# Patient Record
Sex: MALE | Race: BLACK OR AFRICAN AMERICAN | Employment: STUDENT | URBAN - METROPOLITAN AREA
[De-identification: names, ages, dates, MRNs, and addresses within clinical notes are randomized per-mention and may not be internally consistent; named-entity substitution may affect disease eponyms.]

---

## 2019-03-14 ENCOUNTER — OFFICE VISIT (OUTPATIENT)
Dept: FAMILY MEDICINE | Facility: CLINIC | Age: 34
End: 2019-03-14
Payer: COMMERCIAL

## 2019-03-14 VITALS
OXYGEN SATURATION: 100 % | RESPIRATION RATE: 16 BRPM | TEMPERATURE: 98.3 F | WEIGHT: 196.8 LBS | DIASTOLIC BLOOD PRESSURE: 80 MMHG | HEART RATE: 88 BPM | SYSTOLIC BLOOD PRESSURE: 122 MMHG

## 2019-03-14 DIAGNOSIS — Z20.2 CHLAMYDIA CONTACT: ICD-10-CM

## 2019-03-14 DIAGNOSIS — Z11.3 SCREEN FOR STD (SEXUALLY TRANSMITTED DISEASE): Primary | ICD-10-CM

## 2019-03-14 LAB
BACTERIA: NORMAL
BILIRUBIN UR: ABNORMAL
BLOOD UR: ABNORMAL
CASTS: NORMAL /LPF
CRYSTAL URINE: NORMAL /LPF
EPITHELIAL CELLS UR: NORMAL /LPF (ref 0–2)
GLUCOSE URINE: NEGATIVE
KETONES UR QL: NEGATIVE
LEUKOCYTE ESTERASE UR: NEGATIVE
MUCOUS URINE: NORMAL LPF
NITRITE UR QL STRIP: NEGATIVE
PH UR STRIP: 5.5 [PH] (ref 5–7)
PROTEIN UR: ABNORMAL
RBC URINE: <2 /HPF
SP GR UR STRIP: >=1.03
UROBILINOGEN UR STRIP-ACNC: ABNORMAL
WBC URINE: <2 /HPF

## 2019-03-14 RX ORDER — AZITHROMYCIN 500 MG/1
1000 TABLET, FILM COATED ORAL DAILY
Qty: 2 TABLET | Refills: 0 | Status: SHIPPED | OUTPATIENT
Start: 2019-03-14 | End: 2019-03-15

## 2019-03-14 NOTE — LETTER
March 14, 2019      Hal Cain  [address]  [place] [address]        Dear Hal,    Please see below for your test results.  Your urine  was dark brown    needs to be repeated in 4 to 6 weeks after STD treatment     Resulted Orders   Urinalysis, Micro If ([facility])   Result Value Ref Range    Specific Gravity Urine >=1.030 1.005 - 1.030    pH Urine 5.5 4.5 - 8.0    Leukocyte Esterase UR Negative NEGATIVE    Nitrite Urine Negative NEGATIVE    Protein UR 1+ (A) NEGATIVE    Glucose Urine Negative NEGATIVE    Ketones Urine Negative NEGATIVE    Urobilinogen mg/dL 1.0 E.U./dL (A) 0.2 E.U./dL    Bilirubin UR 1+ (A) NEGATIVE    Blood UR 2+ (A) NEGATIVE   Urine Microscopic ([facility])   Result Value Ref Range    WBC Urine <2 <5 /hpf    RBC Urine <2 <5 /hpf    Epithelial Cells UR None 0 - 2 /lpf    Mucous Urine Moderate NONE lpf    Casts Urine None NONE /lpf    Crystal Urine None NONE /lpf    Bacteria Wet Prep Few None       If you have any questions, please call the clinic to make an appointment.    Sincerely,    Jeff Paredes MD

## 2019-03-14 NOTE — LETTER
March 18, 2019      Hal Tavaresduane Shreemickey  [address]  [place] [address]        Dear Hal,  I treated you as contact    your test were normal    follow-up in clinic  4 to 6 weeks retesting and also retest urine urine exam   Please see below for your test results.    Resulted Orders   Chlamydia/Gono Amplified ([facility])   Result Value Ref Range    Chlamydia trac,Amplified Prb Negative Negative    N gonorrhoeae,Amplified Prb Negative Negative    Narrative    Test performed by:  [facility] [address] [place] [address]   Urinalysis, Micro If ([facility])   Result Value Ref Range    Specific Gravity Urine >=1.030 1.005 - 1.030    pH Urine 5.5 4.5 - 8.0    Leukocyte Esterase UR Negative NEGATIVE    Nitrite Urine Negative NEGATIVE    Protein UR 1+ (A) NEGATIVE    Glucose Urine Negative NEGATIVE    Ketones Urine Negative NEGATIVE    Urobilinogen mg/dL 1.0 E.U./dL (A) 0.2 E.U./dL    Bilirubin UR 1+ (A) NEGATIVE    Blood UR 2+ (A) NEGATIVE   Urine Microscopic ([facility])   Result Value Ref Range    WBC Urine <2 <5 /hpf    RBC Urine <2 <5 /hpf    Epithelial Cells UR None 0 - 2 /lpf    Mucous Urine Moderate NONE lpf    Casts Urine None NONE /lpf    Crystal Urine None NONE /lpf    Bacteria Wet Prep Few None       If you have any questions, please call the clinic to make an appointment.    Sincerely,    Jeff Paredes MD

## 2019-03-14 NOTE — PATIENT INSTRUCTIONS
Treated with azithromycin 1000 mg for chlamydia contact  Chlamydia and GC  You  declined STD blood testing

## 2019-03-14 NOTE — PROGRESS NOTES
S: Hal Cain is a 33 year old male who presents for STI testing today   Patients states that main concern today is being tested for chlamydia and gonorrhea as his partner was diagnosed several weeks ago. He believes that she was given two pills in clinic, does not think she was given a shot.     PMHX/PSHX/MEDS/ALLERGIES/SHX/FHX reviewed and updated in Epic.      ROS:  General: No fevers, chills  Head: No headache  Ears: No acute change in hearing.    CV: No chest pain or palpitations.  Resp: No shortness of breath.  No cough. No hemoptysis.  GI: No nausea, vomiting, constipation, diarrhea  : No urinary pains    O: /80   Pulse 88   Temp 98.3  F (36.8  C) (Oral)   Resp 16   Wt 89.3 kg (196 lb 12.8 oz)   SpO2 100%    Gen:  Well nourished and in NAD    CV:  RRR  - no murmurs, rubs, or gallups,   Pulm:  CTAB, no wheezes/rales/rhonchi, good air entry   ABD: soft, nontender, no masses, no rebound, BS intact throughout  Extrem: no cyanosis, edema or clubbing  Psych: Euthymic    Genitalia: normal   (Z11.3) Screen for STD (sexually transmitted disease)  (primary encounter diagnosis)  Comment: Will treat presumptively today for chlamydia given hx of infected partner. Pt declines testing for other STIs including syphilis and HIV.   Plan: Chlamydia/Gono Amplified ([facility])   No sexual activity pending test results  -1g azithromycin Po today         RTC in 6 weeks, for follow up of STI  or sooner if develops new or worsening symptoms.    Jeff Paredes

## 2019-03-15 LAB
C TRACH RRNA SPEC QL NAA+PROBE: NEGATIVE
N GONORRHOEA RRNA SPEC QL NAA+PROBE: NEGATIVE

## 2020-02-14 ENCOUNTER — OFFICE VISIT (OUTPATIENT)
Dept: FAMILY MEDICINE | Facility: CLINIC | Age: 35
End: 2020-02-14
Payer: COMMERCIAL

## 2020-02-14 VITALS
HEART RATE: 81 BPM | TEMPERATURE: 98 F | SYSTOLIC BLOOD PRESSURE: 136 MMHG | WEIGHT: 201 LBS | DIASTOLIC BLOOD PRESSURE: 84 MMHG | RESPIRATION RATE: 16 BRPM | OXYGEN SATURATION: 100 %

## 2020-02-14 DIAGNOSIS — M25.531 RIGHT WRIST PAIN: Primary | ICD-10-CM

## 2020-02-14 RX ORDER — ACETAMINOPHEN 500 MG
500-1000 TABLET ORAL EVERY 6 HOURS PRN
Qty: 30 TABLET | Refills: 1 | Status: SHIPPED | OUTPATIENT
Start: 2020-02-14

## 2020-02-14 RX ORDER — IBUPROFEN 400 MG/1
400 TABLET, FILM COATED ORAL EVERY 6 HOURS PRN
Qty: 30 TABLET | Refills: 1 | Status: SHIPPED | OUTPATIENT
Start: 2020-02-14
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
## 2020-02-14 NOTE — LETTER
RETURN TO WORK/SCHOOL FORM    2/14/2020    Re: Hal Cain  1985      To Whom It May Concern:    Hal Cain was seen in clinic today for right wrist pain.  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  He may return to work with restrictions on 2/17/2020.       Restrictions:  Limited to light duty or desk duties that do not require heavy lifting no more than 10 pounds or repetitive wrist motions that provoke pain.      Sincerely,        Louis Loernz MD  2/14/2020

## 2020-02-14 NOTE — PROGRESS NOTES
Preceptor attestation:  Vital signs reviewed: /84   Pulse 81   Temp 98  F (36.7  C) (Oral)   Resp 16   Wt 91.2 kg (201 lb)   SpO2 100%     Patient seen, evaluated, and discussed with the resident.  I have verified the content of the note, which accurately reflects my assessment of the patient and the plan of care.    Supervising physician: Annia Mittal MD  [facility]

## 2022-02-24 ENCOUNTER — OFFICE VISIT (OUTPATIENT)
Dept: FAMILY MEDICINE | Facility: CLINIC | Age: 37
End: 2022-02-24
Payer: COMMERCIAL

## 2022-02-24 VITALS
SYSTOLIC BLOOD PRESSURE: 135 MMHG | TEMPERATURE: 98.1 F | WEIGHT: 198.6 LBS | OXYGEN SATURATION: 98 % | HEART RATE: 83 BPM | RESPIRATION RATE: 16 BRPM | DIASTOLIC BLOOD PRESSURE: 99 MMHG

## 2022-02-24 DIAGNOSIS — Z11.3 SCREEN FOR STD (SEXUALLY TRANSMITTED DISEASE): Primary | ICD-10-CM

## 2022-02-24 DIAGNOSIS — D23.5 DERMATOFIBROMA OF BACK: ICD-10-CM

## 2022-02-24 DIAGNOSIS — Z23 NEED FOR VACCINATION: ICD-10-CM

## 2022-02-24 PROCEDURE — 90715 TDAP VACCINE 7 YRS/> IM: CPT | Performed by: STUDENT IN AN ORGANIZED HEALTH CARE EDUCATION/TRAINING PROGRAM

## 2022-02-24 PROCEDURE — 87591 N.GONORRHOEAE DNA AMP PROB: CPT | Performed by: STUDENT IN AN ORGANIZED HEALTH CARE EDUCATION/TRAINING PROGRAM

## 2022-02-24 PROCEDURE — 99213 OFFICE O/P EST LOW 20 MIN: CPT | Mod: 25 | Performed by: STUDENT IN AN ORGANIZED HEALTH CARE EDUCATION/TRAINING PROGRAM

## 2022-02-24 PROCEDURE — 90471 IMMUNIZATION ADMIN: CPT | Performed by: STUDENT IN AN ORGANIZED HEALTH CARE EDUCATION/TRAINING PROGRAM

## 2022-02-24 PROCEDURE — 87491 CHLMYD TRACH DNA AMP PROBE: CPT | Performed by: STUDENT IN AN ORGANIZED HEALTH CARE EDUCATION/TRAINING PROGRAM

## 2022-02-24 NOTE — PROGRESS NOTES
There are no exam notes on file for this visit.    Assessment & Plan      1. Screen for STD (sexually transmitted disease)  Asymptomatic, but got text he was exposed to chlamydia. Declines blood work for HIV, HepC, Syphilis today.   - NEISSERIA GONORRHOEA PCR  - CHLAMYDIA TRACHOMATIS PCR    2. Dermatofibroma of back  Scheduled for mole removal 3/10/22.    3. Need for vaccination  - TDAP VACCINE (Adacel, Boostrix)  [[id number]]      Preventative Health: Declines flu and covid vaccinations, declines scheduling CPE today    Ordering of each unique test  30 minutes spent on the date of the encounter doing chart review, history and exam, documentation and further activities per the note    Benita Behm, MD PGY2  [facility]  02/24/22    I precepted today with Dr. Osuna.    Subjective   Hal Cain is a 36 year old who presents for the following health issues: Exposure to chlamydia    HPI    Patient coming in today because he got a text message from an [employer] STD reporting website saying he was potentially exposed to a past sexual partner who tested positive for chlamydia. He denies pelvic or back pain, joint aches, fevers, chills, dysuria, penile discharge. He had an STD ~20 years ago. NKDA. He only wants to be tested for chlamydia and gonorrhea today, declines blood draw for other STDs.     He also wonders if he could get a mole removed from his back. He says it has gotten bigger and bothers him.     Review of Systems  Constitutional, HEENT, cardiovascular, pulmonary, GI, , musculoskeletal, neuro, skin, endocrine and psych systems are negative, except as otherwise noted.    Objective   BP (!) 135/99   Pulse 83   Temp 98.1  F (36.7  C) (Oral)   Resp 16   Wt 90.1 kg (198 lb 9.6 oz)   SpO2 98%   Physical Exam    Constitutional: Awake, alert, cooperative, no apparent distress  Eyes: Lids and lashes normal, pupils equal, round and reactive to light, extra ocular muscles intact,  sclera clear, conjunctiva normal.  ENT: Normocephalic, without obvious abnormality, atraumatic, external ears without lesions.  Neck: Supple, symmetrical, trachea midline, skin normal.  Lungs: No increased work of breathing, no audible wheezing  Musculoskeletal: Full range of motion noted. Tone is normal.  Neurologic: Awake, alert, oriented to name, place and time.  Cranial nerves II-XII are grossly intact. Gait is normal.  Neuropsychiatric: Normal affect, mood, orientation, memory and insight.  Skin: No visible rashes, erythema, pallor, petechia or purpura. Mole on mid thoracic spine consistent with dermatofibroma    ----- Service Performed and Documented by Resident or Fellow ------

## 2022-02-24 NOTE — PROGRESS NOTES
Preceptor Attestation:   Patient seen, evaluated and discussed with the resident. I have verified the content of the note, which accurately reflects my assessment of the patient and the plan of care.   Supervising Physician:  Nahid Osuna MD

## 2022-02-25 LAB
C TRACH DNA SPEC QL NAA+PROBE: NEGATIVE
N GONORRHOEA DNA SPEC QL NAA+PROBE: NEGATIVE